# Patient Record
Sex: FEMALE | Race: BLACK OR AFRICAN AMERICAN | NOT HISPANIC OR LATINO | URBAN - METROPOLITAN AREA
[De-identification: names, ages, dates, MRNs, and addresses within clinical notes are randomized per-mention and may not be internally consistent; named-entity substitution may affect disease eponyms.]

---

## 2019-06-17 ENCOUNTER — OFFICE (OUTPATIENT)
Dept: URBAN - METROPOLITAN AREA CLINIC 14 | Facility: CLINIC | Age: 44
End: 2019-06-17
Payer: COMMERCIAL

## 2019-06-17 VITALS
HEIGHT: 61 IN | WEIGHT: 293 LBS | DIASTOLIC BLOOD PRESSURE: 98 MMHG | HEART RATE: 56 BPM | SYSTOLIC BLOOD PRESSURE: 146 MMHG

## 2019-06-17 DIAGNOSIS — Z98.84 BARIATRIC SURGERY STATUS: ICD-10-CM

## 2019-06-17 DIAGNOSIS — R63.5 ABNORMAL WEIGHT GAIN: ICD-10-CM

## 2019-06-17 DIAGNOSIS — R11.2 NAUSEA WITH VOMITING, UNSPECIFIED: ICD-10-CM

## 2019-06-17 PROCEDURE — 99204 OFFICE O/P NEW MOD 45 MIN: CPT | Performed by: NURSE PRACTITIONER

## 2019-06-17 NOTE — SERVICEHPINOTES
Becky Sanchez   is a   43   year old  female   here today  At the request of Dr. Jacob for evaluation of pouch size.  The patient had a lap band in 2009 with subsequent revision to a gastric bypass in 2011.  She initially had good response this surgery but notes over the last 8 months she has gained 60 pounds.  She does continue to feel some restriction when she eats.  She denies any heartburn, reflux or dysphagia.  She does note for the last 2 years she has had intermittent episodes of nausea with occasional vomiting of clear liquid.  This has worsened over the last 2 months.  She is not aware of anything that would have triggered this symptom.  She denies any change in her bowel habits, melena or hematochezia.